# Patient Record
Sex: FEMALE | Race: WHITE | Employment: FULL TIME | URBAN - NONMETROPOLITAN AREA
[De-identification: names, ages, dates, MRNs, and addresses within clinical notes are randomized per-mention and may not be internally consistent; named-entity substitution may affect disease eponyms.]

---

## 2019-05-27 ENCOUNTER — HOSPITAL ENCOUNTER (EMERGENCY)
Age: 64
Discharge: HOME | End: 2019-05-27
Payer: COMMERCIAL

## 2019-05-27 VITALS
RESPIRATION RATE: 16 BRPM | OXYGEN SATURATION: 95 % | TEMPERATURE: 99.5 F | HEART RATE: 86 BPM | DIASTOLIC BLOOD PRESSURE: 77 MMHG | SYSTOLIC BLOOD PRESSURE: 142 MMHG | WEIGHT: 235 LBS

## 2019-05-27 DIAGNOSIS — J45.909 MILD ASTHMA WITHOUT COMPLICATION, UNSPECIFIED WHETHER PERSISTENT: ICD-10-CM

## 2019-05-27 DIAGNOSIS — G47.30 SLEEP APNEA, UNSPECIFIED TYPE: ICD-10-CM

## 2019-05-27 DIAGNOSIS — J01.40 ACUTE PANSINUSITIS, RECURRENCE NOT SPECIFIED: Primary | ICD-10-CM

## 2019-05-27 PROCEDURE — 99202 OFFICE O/P NEW SF 15 MIN: CPT | Performed by: NURSE PRACTITIONER

## 2019-05-27 PROCEDURE — 99213 OFFICE O/P EST LOW 20 MIN: CPT

## 2019-05-27 RX ORDER — ESOMEPRAZOLE MAGNESIUM 40 MG/1
20 FOR SUSPENSION ORAL 2 TIMES DAILY
COMMUNITY

## 2019-05-27 RX ORDER — MONTELUKAST SODIUM 10 MG/1
10 TABLET ORAL NIGHTLY
COMMUNITY

## 2019-05-27 RX ORDER — PROPRANOLOL HYDROCHLORIDE 80 MG/1
80 TABLET ORAL 3 TIMES DAILY
COMMUNITY

## 2019-05-27 RX ORDER — PREDNISONE 20 MG/1
20 TABLET ORAL 2 TIMES DAILY
COMMUNITY

## 2019-05-27 RX ORDER — AZITHROMYCIN 250 MG/1
TABLET, FILM COATED ORAL
Qty: 6 TABLET | Refills: 0 | Status: SHIPPED | OUTPATIENT
Start: 2019-05-27

## 2019-05-27 RX ORDER — MELOXICAM 15 MG/1
15 TABLET ORAL DAILY
COMMUNITY

## 2019-05-27 RX ORDER — EXEMESTANE 25 MG/1
25 TABLET ORAL DAILY
COMMUNITY

## 2019-05-27 RX ORDER — ESCITALOPRAM OXALATE 10 MG/1
10 TABLET ORAL DAILY
COMMUNITY

## 2019-05-27 ASSESSMENT — ENCOUNTER SYMPTOMS
CONSTIPATION: 0
SINUS PRESSURE: 1
RHINORRHEA: 1
WHEEZING: 0
ABDOMINAL PAIN: 0
DIARRHEA: 0
SORE THROAT: 1
SHORTNESS OF BREATH: 0
NAUSEA: 0
COUGH: 1

## 2019-05-27 ASSESSMENT — PAIN DESCRIPTION - LOCATION: LOCATION: THROAT

## 2019-05-27 ASSESSMENT — PAIN DESCRIPTION - FREQUENCY: FREQUENCY: CONTINUOUS

## 2019-05-27 ASSESSMENT — PAIN DESCRIPTION - DESCRIPTORS: DESCRIPTORS: ACHING

## 2019-05-27 ASSESSMENT — PAIN DESCRIPTION - PAIN TYPE: TYPE: ACUTE PAIN

## 2019-05-27 ASSESSMENT — PAIN - FUNCTIONAL ASSESSMENT: PAIN_FUNCTIONAL_ASSESSMENT: ACTIVITIES ARE NOT PREVENTED

## 2019-05-27 ASSESSMENT — PAIN SCALES - GENERAL: PAINLEVEL_OUTOF10: 2

## 2019-05-27 NOTE — ED PROVIDER NOTES
[facility], later [facility]  Urgent Care Encounter      CHIEF COMPLAINT       Chief Complaint   Patient presents with    Cough     productive, green    Pharyngitis     low grade fever    Sinusitis     pressure       Nurses Notes reviewed and I agree except as noted in the HPI. HISTORY OF PRESENT ILLNESS   Yonatan Nuñez is a 66 y.o. female who presents with 4 days of increasingly worse cough, congestion, sore throat, postnasal drip, headache, bilateral ear pressure, malaise and fever. Patient is here from [facility] to temporally take care of her parents. She has a history of fairly significant asthma and did start taking prednisone yesterday for her symptoms. She is a nonsmoker. She also has a history of sleep apnea and uses a CPAP. REVIEW OF SYSTEMS     Review of Systems   Constitutional: Positive for fatigue and fever. Negative for appetite change and diaphoresis. HENT: Positive for congestion, postnasal drip, rhinorrhea, sinus pressure and sore throat. Negative for tinnitus. Eyes: Negative for visual disturbance. Respiratory: Positive for cough. Negative for shortness of breath and wheezing. Cardiovascular: Negative for chest pain and leg swelling. Gastrointestinal: Negative for abdominal pain, constipation, diarrhea and nausea. Genitourinary: Negative for frequency. Musculoskeletal: Negative for arthralgias, myalgias and neck stiffness. Skin: Negative for rash. Neurological: Positive for headaches. Negative for dizziness and weakness. Psychiatric/Behavioral: The patient is not nervous/anxious. All other systems reviewed and are negative. PAST MEDICAL HISTORY         Diagnosis Date    Anxiety     Arthritis     Asthma     Cancer ([facility] [address] 2016    breast cancer    Depression     GERD (gastroesophageal reflux disease)     Seasonal allergies        SURGICAL HISTORY     Patient  has a past surgical history that includes knee surgery and Breast surgery.     CURRENT MEDICATIONS       Previous Medications    DOCUSATE SODIUM (COLACE PO)    Take by mouth    ESCITALOPRAM (LEXAPRO) 10 MG TABLET    Take 10 mg by mouth daily    ESOMEPRAZOLE MAGNESIUM (NEXIUM) 40 MG PACK    Take 20 mg by mouth 2 times daily    EXEMESTANE (AROMASIN) 25 MG TABLET    Take 25 mg by mouth daily    FLUTICASONE FUROATE-VILANTEROL (BREO ELLIPTA IN)    Inhale into the lungs    FLUTICASONE PROPIONATE (FLONASE NA)    by Nasal route    GUAIFENESIN (MUCINEX PO)    Take 2 tablets by mouth    IBANDRONATE SODIUM (BONIVA PO)    Take 1 tablet by mouth    MELOXICAM (MOBIC) 15 MG TABLET    Take 15 mg by mouth daily    MONTELUKAST (SINGULAIR) 10 MG TABLET    Take 10 mg by mouth nightly    PREDNISONE (DELTASONE) 20 MG TABLET    Take 20 mg by mouth 2 times daily    PROPRANOLOL (INDERAL) 80 MG TABLET    Take 80 mg by mouth 3 times daily       ALLERGIES     Patient is is allergic to phentermine hcl. FAMILY HISTORY     Patient'sfamily history includes Cancer in her father; Heart Attack in her mother; Heart Disease in her mother; High Blood Pressure in her father; High Cholesterol in her father. SOCIAL HISTORY     Patient  reports that she has never smoked. She has never used smokeless tobacco. She reports that she drinks alcohol. She reports that she does not use drugs. PHYSICAL EXAM     ED TRIAGE VITALS  BP: (!) 142/77, Temp: 99.5 °F (37.5 °C), Pulse: 86, Resp: 16, SpO2: 95 %  Physical Exam   Constitutional: She is oriented to person, place, and time. She appears well-developed and well-nourished. She is cooperative. She appears ill. No distress. HENT:   Right Ear: Hearing, external ear and ear canal normal. Tympanic membrane is bulging. Left Ear: Hearing, external ear and ear canal normal. Tympanic membrane is bulging. Nose: Mucosal edema and rhinorrhea present. Right sinus exhibits maxillary sinus tenderness. Left sinus exhibits maxillary sinus tenderness.    Mouth/Throat: Uvula is midline and mucous membranes are normal. No uvula swelling. Posterior oropharyngeal erythema present. No oropharyngeal exudate or posterior oropharyngeal edema. Tonsils are 0 on the right. Tonsils are 0 on the left. No tonsillar exudate. Eyes: Pupils are equal, round, and reactive to light. Conjunctivae, EOM and lids are normal. Right eye exhibits no discharge. Left eye exhibits no discharge. Neck: Normal range of motion and full passive range of motion without pain. Neck supple. No muscular tenderness present. Cardiovascular: Normal rate, regular rhythm and normal heart sounds. Pulmonary/Chest: Effort normal and breath sounds normal. No accessory muscle usage. No respiratory distress. She has no wheezes. She has no rales. Lymphadenopathy:     She has no cervical adenopathy. Neurological: She is alert and oriented to person, place, and time. She has normal strength. No cranial nerve deficit. Coordination and gait normal. GCS eye subscore is 4. GCS verbal subscore is 5. GCS motor subscore is 6. Skin: Skin is warm and dry. She is not diaphoretic. Psychiatric: She has a normal mood and affect. Her speech is normal and behavior is normal. Judgment and thought content normal. Cognition and memory are normal.   Nursing note and vitals reviewed. DIAGNOSTIC RESULTS   Labs: No results found for this visit on 05/27/19. IMAGING:    URGENT CARE COURSE:     Vitals:    05/27/19 1433   BP: (!) 142/77   Pulse: 86   Resp: 16   Temp: 99.5 °F (37.5 °C)   TempSrc: Oral   SpO2: 95%   Weight: 235 lb (106.6 kg)       Medications - No data to display  PROCEDURES:  None  FINAL IMPRESSION      1. Acute pansinusitis, recurrence not specified    2. Mild asthma without complication, unspecified whether persistent    3. Sleep apnea, unspecified type        DISPOSITION/PLAN   DISPOSITION Decision To Discharge 05/27/2019 03:03:17 PM    Patient is placed on Zithromax due to her preference from previous successful treatment.     PATIENT REFERRED TO:   [facility]  [address] [facility]  [phone]    If symptoms worsen    DISCHARGE MEDICATIONS:  New Prescriptions    AZITHROMYCIN (ZITHROMAX Z-MATTHIEU) 250 MG TABLET    2 tablets day 1 then1 tablet days 2 - 5.      Current Discharge Medication List          WISAM Zuleta CNP, APRN - CNP  05/27/19 3324

## 2019-05-27 NOTE — ED TRIAGE NOTES
Patient ambulated to  Rm. 5, here from [place], taking care of mother. C/O 6 days, productive harsh cough, sinus pressure, sore throat, swollen glands. Low grade fever, singulair, flonase, breo inhaler, prednisone. Patient states amoxicillin doesn't work for her.

## 2022-04-23 ENCOUNTER — HOSPITAL ENCOUNTER (EMERGENCY)
Age: 67
Discharge: HOME | End: 2022-04-23
Payer: COMMERCIAL

## 2022-04-23 VITALS
TEMPERATURE: 98.2 F | RESPIRATION RATE: 18 BRPM | DIASTOLIC BLOOD PRESSURE: 62 MMHG | BODY MASS INDEX: 34.99 KG/M2 | HEIGHT: 65 IN | SYSTOLIC BLOOD PRESSURE: 110 MMHG | WEIGHT: 210 LBS | HEART RATE: 79 BPM | OXYGEN SATURATION: 95 %

## 2022-04-23 DIAGNOSIS — N30.01 ACUTE CYSTITIS WITH HEMATURIA: Primary | ICD-10-CM

## 2022-04-23 LAB
BILIRUBIN URINE: NEGATIVE
BLOOD, URINE: ABNORMAL
CHARACTER, URINE: CLEAR
COLOR: YELLOW
GLUCOSE URINE: 100 MG/DL
KETONES, URINE: NEGATIVE
LEUKOCYTE ESTERASE, URINE: ABNORMAL
NITRITE, URINE: POSITIVE
PH UA: 5.5 (ref 5–9)
PROTEIN UA: >= 300 MG/DL
SPECIFIC GRAVITY UA: >= 1.03 (ref 1–1.03)
UROBILINOGEN, URINE: 1 EU/DL (ref 0.2–1)

## 2022-04-23 PROCEDURE — 87077 CULTURE AEROBIC IDENTIFY: CPT

## 2022-04-23 PROCEDURE — 99213 OFFICE O/P EST LOW 20 MIN: CPT

## 2022-04-23 PROCEDURE — 99213 OFFICE O/P EST LOW 20 MIN: CPT | Performed by: NURSE PRACTITIONER

## 2022-04-23 PROCEDURE — 87186 SC STD MICRODIL/AGAR DIL: CPT

## 2022-04-23 PROCEDURE — 81003 URINALYSIS AUTO W/O SCOPE: CPT

## 2022-04-23 PROCEDURE — 87086 URINE CULTURE/COLONY COUNT: CPT

## 2022-04-23 RX ORDER — NITROFURANTOIN 25; 75 MG/1; MG/1
100 CAPSULE ORAL 2 TIMES DAILY
Qty: 14 CAPSULE | Refills: 0 | Status: SHIPPED | OUTPATIENT
Start: 2022-04-23 | End: 2022-04-30

## 2022-04-23 RX ORDER — PHENAZOPYRIDINE HYDROCHLORIDE 100 MG/1
100 TABLET, FILM COATED ORAL 3 TIMES DAILY PRN
Qty: 12 TABLET | Refills: 0 | Status: SHIPPED | OUTPATIENT
Start: 2022-04-23 | End: 2022-04-26

## 2022-04-23 ASSESSMENT — ENCOUNTER SYMPTOMS
VOMITING: 0
SORE THROAT: 0
NAUSEA: 0
SHORTNESS OF BREATH: 0
CHEST TIGHTNESS: 0
DIARRHEA: 0
RHINORRHEA: 0
COUGH: 0

## 2022-04-23 NOTE — ED PROVIDER NOTES
[facility]  Urgent Care Encounter       CHIEF COMPLAINT       Chief Complaint   Patient presents with    Dysuria     urgency and  blood inurine onset last night       Nurses Notes reviewed and I agree except as noted in the HPI. HISTORY OF PRESENT ILLNESS   Darcie Barton is a 61 y.o. female who presents to the [facility] urgent care for evaluation of dysuria. She reports her symptoms started yesterday. She does report that she was in the hospital yesterday and did not drink much fluids besides soda. she denies having a history of frequent UTIs. She did report hematuria. She reports urgency and frequency. She denies back or flank pain. None    The history is provided by the patient. No  was used. REVIEW OF SYSTEMS     Review of Systems   Constitutional: Negative for activity change, appetite change, chills, fatigue and fever. HENT: Negative for ear discharge, ear pain, rhinorrhea and sore throat. Respiratory: Negative for cough, chest tightness and shortness of breath. Cardiovascular: Negative for chest pain. Gastrointestinal: Negative for diarrhea, nausea and vomiting. Genitourinary: Positive for dysuria, frequency and urgency. Skin: Negative for rash. Allergic/Immunologic: Negative for environmental allergies and food allergies. Neurological: Negative for dizziness and headaches. PAST MEDICAL HISTORY         Diagnosis Date    Anxiety     Arthritis     Asthma     Cancer ([facility] [address] 2016    breast cancer    Depression     GERD (gastroesophageal reflux disease)     Seasonal allergies        SURGICALHISTORY     Patient  has a past surgical history that includes knee surgery and Breast surgery.     CURRENT MEDICATIONS       Discharge Medication List as of 4/23/2022 12:45 PM      CONTINUE these medications which have NOT CHANGED    Details   montelukast (SINGULAIR) 10 MG tablet Take 10 mg by mouth nightlyHistorical Med      exemestane (AROMASIN) 25 MG tablet Take 25 mg by mouth dailyHistorical Med      Ibandronate Sodium (BONIVA PO) Take 1 tablet by mouthHistorical Med      meloxicam (MOBIC) 15 MG tablet Take 15 mg by mouth dailyHistorical Med      Fluticasone Propionate (FLONASE NA) by Nasal routeHistorical Med      propranolol (INDERAL) 80 MG tablet Take 80 mg by mouth 3 times dailyHistorical Med      escitalopram (LEXAPRO) 10 MG tablet Take 10 mg by mouth dailyHistorical Med      Docusate Sodium (COLACE PO) Take by mouthHistorical Med      esomeprazole Magnesium (NEXIUM) 40 MG PACK Take 20 mg by mouth 2 times dailyHistorical Med      predniSONE (DELTASONE) 20 MG tablet Take 20 mg by mouth 2 times dailyHistorical Med      guaiFENesin (MUCINEX PO) Take 2 tablets by mouthHistorical Med      Fluticasone Furoate-Vilanterol (BREO ELLIPTA IN) Inhale into the lungsHistorical Med      azithromycin (ZITHROMAX Z-MATTHIEU) 250 MG tablet 2 tablets day 1 then1 tablet days 2 - 5., Disp-6 tablet, R-0Normal             ALLERGIES     Patient is is allergic to phentermine hcl. Patients   There is no immunization history on file for this patient. FAMILY HISTORY     Patient's family history includes Cancer in her father; Heart Attack in her mother; Heart Disease in her mother; High Blood Pressure in her father; High Cholesterol in her father. SOCIAL HISTORY     Patient  reports that she has never smoked. She has never used smokeless tobacco. She reports current alcohol use. She reports that she does not use drugs. PHYSICAL EXAM     ED TRIAGE VITALS  BP: 110/62, Temp: 98.2 °F (36.8 °C), Pulse: 79, Resp: 18, SpO2: 95 %,Estimated body mass index is 34.95 kg/m² as calculated from the following:    Height as of this encounter: 5' 5\" (1.651 m). Weight as of this encounter: 210 lb (95.3 kg). ,No LMP recorded. Patient is postmenopausal.    Physical Exam  Vitals and nursing note reviewed. Constitutional:       General: She is not in acute distress. Appearance: Normal appearance. She is not ill-appearing, toxic-appearing or diaphoretic. HENT:      Head: Normocephalic. Right Ear: Ear canal and external ear normal.      Left Ear: Ear canal and external ear normal.      Nose: Nose normal. No congestion or rhinorrhea. Mouth/Throat:      Mouth: Mucous membranes are moist.      Pharynx: Oropharynx is clear. No oropharyngeal exudate or posterior oropharyngeal erythema. Cardiovascular:      Rate and Rhythm: Normal rate. Pulses: Normal pulses. Pulmonary:      Effort: Pulmonary effort is normal. No respiratory distress. Breath sounds: No stridor. No wheezing or rhonchi. Abdominal:      General: Abdomen is flat. Bowel sounds are normal.      Palpations: Abdomen is soft. Musculoskeletal:         General: No swelling or tenderness. Normal range of motion. Cervical back: Normal range of motion. Neurological:      General: No focal deficit present. Mental Status: She is alert and oriented to person, place, and time.    Psychiatric:         Mood and Affect: Mood normal.         Behavior: Behavior normal.         DIAGNOSTIC RESULTS     Labs:  Results for orders placed or performed during the hospital encounter of 04/23/22   Urinalysis   Result Value Ref Range    Glucose, Ur 100 (A) NEGATIVE mg/dl    Bilirubin Urine Negative NEGATIVE    Ketones, Urine Negative NEGATIVE    Specific Gravity, UA >=1.030 1.002 - 1.030    Blood, Urine Large (A) NEGATIVE    pH, UA 5.50 5.0 - 9.0    Protein, UA >= 300 (A) NEGATIVE mg/dl    Urobilinogen, Urine 1.00 0.2 - 1.0 eu/dl    Nitrite, Urine Positive (A) NEGATIVE    Leukocyte Esterase, Urine Moderate (A) NEGATIVE    Color, UA Yellow STRAW-YELLOW    Character, Urine Clear CLEAR-SL CLOUD       IMAGING:    No orders to display         EKG: None      URGENT CARE COURSE:     Vitals:    04/23/22 1222   BP: 110/62   Pulse: 79   Resp: 18   Temp: 98.2 °F (36.8 °C)   SpO2: 95%   Weight: 210 lb (95.3 kg)   Height: 5' 5\" (1.651 m)       Medications - No data to display         PROCEDURES:  None    FINAL IMPRESSION      1. Acute cystitis with hematuria          DISPOSITION/ PLAN     Patient seen and evaluated for dysuria. A urinalysis was obtained revealing large blood, moderate leukocytes, and positive nitrites. She is provided a prescription for Macrobid and Pyridium. A urine culture has also been ordered. She is instructed to follow-up with her PCP in 3 to 5 days no worsening symptoms. She does report that she is from [place] and that she will be traveling home to [place] in the next 5 to 7 days. She is agreeable with the above plan and denies questions or concerns at this time. PATIENT REFERRED TO:  No primary care provider on file. No primary physician on file.       DISCHARGE MEDICATIONS:  Discharge Medication List as of 4/23/2022 12:45 PM      START taking these medications    Details   nitrofurantoin, macrocrystal-monohydrate, (MACROBID) 100 MG capsule Take 1 capsule by mouth 2 times daily for 7 days, Disp-14 capsule, R-0Normal      phenazopyridine (PYRIDIUM) 100 MG tablet Take 1 tablet by mouth 3 times daily as needed for Pain, Disp-12 tablet, R-0Normal             Discharge Medication List as of 4/23/2022 12:45 PM          Discharge Medication List as of 4/23/2022 12:45 PM          Poonam Clayton, APRN - CNP    (Please note that portions of this note were completed with a voice recognition program. Efforts were made to edit the dictations but occasionally words are mis-transcribed.)           WISAM Todd CNP  04/23/22 5848

## 2022-04-25 LAB
ORGANISM: ABNORMAL
URINE CULTURE, ROUTINE: ABNORMAL

## 2023-02-25 ENCOUNTER — APPOINTMENT (OUTPATIENT)
Dept: GENERAL RADIOLOGY | Age: 68
End: 2023-02-25
Payer: MEDICARE

## 2023-02-25 ENCOUNTER — HOSPITAL ENCOUNTER (EMERGENCY)
Age: 68
Discharge: HOME | End: 2023-02-25
Payer: MEDICARE

## 2023-02-25 VITALS
SYSTOLIC BLOOD PRESSURE: 123 MMHG | TEMPERATURE: 98.6 F | OXYGEN SATURATION: 97 % | BODY MASS INDEX: 33.28 KG/M2 | WEIGHT: 200 LBS | DIASTOLIC BLOOD PRESSURE: 74 MMHG | RESPIRATION RATE: 16 BRPM | HEART RATE: 72 BPM

## 2023-02-25 DIAGNOSIS — S32.040A CLOSED COMPRESSION FRACTURE OF L4 LUMBAR VERTEBRA, INITIAL ENCOUNTER (HCC): Primary | ICD-10-CM

## 2023-02-25 PROCEDURE — 72100 X-RAY EXAM L-S SPINE 2/3 VWS: CPT

## 2023-02-25 PROCEDURE — 99213 OFFICE O/P EST LOW 20 MIN: CPT | Performed by: EMERGENCY MEDICINE

## 2023-02-25 PROCEDURE — 99213 OFFICE O/P EST LOW 20 MIN: CPT

## 2023-02-25 RX ORDER — TRAMADOL HYDROCHLORIDE 50 MG/1
50 TABLET ORAL EVERY 6 HOURS PRN
Qty: 12 TABLET | Refills: 0 | Status: SHIPPED | OUTPATIENT
Start: 2023-02-25 | End: 2023-02-28

## 2023-02-25 RX ORDER — PREDNISONE 20 MG/1
20 TABLET ORAL DAILY
Qty: 5 TABLET | Refills: 0 | Status: SHIPPED | OUTPATIENT
Start: 2023-02-25 | End: 2023-03-02

## 2023-02-25 ASSESSMENT — PAIN DESCRIPTION - ORIENTATION: ORIENTATION: LOWER

## 2023-02-25 ASSESSMENT — PAIN - FUNCTIONAL ASSESSMENT: PAIN_FUNCTIONAL_ASSESSMENT: 0-10

## 2023-02-25 ASSESSMENT — ENCOUNTER SYMPTOMS
ABDOMINAL PAIN: 0
SHORTNESS OF BREATH: 0
COUGH: 0
BACK PAIN: 1

## 2023-02-25 ASSESSMENT — PAIN SCALES - GENERAL: PAINLEVEL_OUTOF10: 7

## 2023-02-25 ASSESSMENT — PAIN DESCRIPTION - LOCATION: LOCATION: BACK

## 2023-02-25 NOTE — DISCHARGE INSTRUCTIONS
Prednisone daily as prescribed    Tramadol as directed as needed for pain    You may take Tylenol additionally for pain control    Ice to the lower back    Follow-up with orthopedics in [place] when you return on Tuesday    Go to the emergency department for worsening back pain, worsening numbness or tingling down 1 or both legs, weakness to your legs, loss of control of bowel or bladder, or any new concerns

## 2023-02-25 NOTE — ED TRIAGE NOTES
Lily Navas arrives to room with complaint of  low back pain after fall today. Pt fell on gluteal today trying to help her father from the floor after a fall, now will low back pain, with pain radiating down both legs.

## 2023-02-25 NOTE — ED PROVIDER NOTES
[facility]       CHIEF COMPLAINT       Chief Complaint   Patient presents with    Back Pain       Nurses Notes reviewed and I agree except as noted in the HPI. HISTORY OF PRESENT ILLNESS   Carroll Larson is a 74 y.o. female who presents for complaints of low back pain. Patient states that her father fell today. She was trying to help him up from the ground and she fell backwards. She states she landed on her buttocks. She was able to stand up and called the squad to help her dad. She states that she has been having lumbar back pain since her fall. The patient states that she has scoliosis and has degenerative disc disease in the lower back. She is treated with chiropractor therapy. She states this keeps it under control. She states the pain is a 7 on a 10 scale and when she sits down, she gets some radiation down each leg to her thigh level. When she stands up, the radiation stops. She denies any weakness to her lower extremities. No loss of bowel or bladder continence. HPI    REVIEW OF SYSTEMS     Review of Systems   Constitutional:  Negative for activity change and fever. Respiratory:  Negative for cough and shortness of breath. Gastrointestinal:  Negative for abdominal pain. Genitourinary:  Negative for decreased urine volume, difficulty urinating and urgency. Musculoskeletal:  Positive for back pain. PAST MEDICAL HISTORY         Diagnosis Date    Anxiety     Arthritis     Asthma     Cancer ([facility] [address] 2016    breast cancer    Depression     GERD (gastroesophageal reflux disease)     Seasonal allergies        SURGICALHISTORY     Patient  has a past surgical history that includes knee surgery and Breast surgery.     CURRENT MEDICATIONS       Discharge Medication List as of 2/25/2023  4:32 PM        CONTINUE these medications which have NOT CHANGED    Details   Fluticasone-Salmeterol (AIRDUO RESPICLICK 989/54 IN) Inhale into the lungsHistorical Med montelukast (SINGULAIR) 10 MG tablet Take 10 mg by mouth nightlyHistorical Med      exemestane (AROMASIN) 25 MG tablet Take 25 mg by mouth dailyHistorical Med      propranolol (INDERAL) 80 MG tablet Take 80 mg by mouth 3 times dailyHistorical Med      escitalopram (LEXAPRO) 10 MG tablet Take 10 mg by mouth dailyHistorical Med      Docusate Sodium (COLACE PO) Take by mouthHistorical Med             ALLERGIES     Patient is is allergic to phentermine hcl. Patients   There is no immunization history on file for this patient. FAMILY HISTORY     Patient's family history includes Cancer in her father; Heart Attack in her mother; Heart Disease in her mother; High Blood Pressure in her father; High Cholesterol in her father. SOCIAL HISTORY     Patient  reports that she has never smoked. She has never used smokeless tobacco. She reports current alcohol use. She reports that she does not use drugs. PHYSICAL EXAM     ED TRIAGE VITALS  BP: 123/74, Temp: 98.6 °F (37 °C), Heart Rate: 72, Resp: 16, SpO2: 97 %,Estimated body mass index is 33.28 kg/m² as calculated from the following:    Height as of 4/23/22: 5' 5\" (1.651 m). Weight as of this encounter: 200 lb (90.7 kg). ,No LMP recorded. Patient is postmenopausal.    Physical Exam  Constitutional:       Appearance: She is obese. She is not ill-appearing. HENT:      Head: Normocephalic. Cardiovascular:      Rate and Rhythm: Normal rate. Pulmonary:      Effort: Pulmonary effort is normal.   Musculoskeletal:      Lumbar back: Tenderness and bony tenderness present. No deformity or spasms. Back:       Comments: Tenderness to the lower lumbar/sacral region of her back. No bruising or swelling is noted. Neurological:      Mental Status: She is alert. DIAGNOSTIC RESULTS     Labs:No results found for this visit on 02/25/23. IMAGING:    XR LUMBAR SPINE (2-3 VIEWS)   Final Result   Age-indeterminate compression fracture of the L4 vertebral body. **This report has been created using voice recognition software. It may contain minor errors which are inherent in voice recognition technology. **      Final report electronically signed by Dr Errol Arvizu on 2/25/2023 4:20 PM            EKG:      URGENT CARE COURSE:     Vitals:    02/25/23 1546   BP: 123/74   Pulse: 72   Resp: 16   Temp: 98.6 °F (37 °C)   TempSrc: Oral   SpO2: 97%   Weight: 200 lb (90.7 kg)       Medications - No data to display         PROCEDURES:  None    FINAL IMPRESSION      1. Closed compression fracture of L4 lumbar vertebra, initial encounter [facility])          DISPOSITION/ PLAN   Patient presents for is likely a new L4 compression fracture. Patient has had lumbar x-rays in [place], [place] in December 2022. There is no compression fracture noted on the x-ray reading. Patient has no neurodeficits but does have some mild pain radiation to bilateral hips when she sits down. I will place patient on prednisone and tramadol for treatment of symptoms. She is advised take Tylenol. Ice to the area frequently. The patient is from [place], [place] area. I advised I can set up with orthopedic spine in the area on Monday of next week, however, she will be going to [facility] on Tuesday and states would rather see her orthopedist in that area. The patient is advised to go to the emergency department for evaluation for any weakness to her lower extremities, any loss of bowel or bladder control, worsening pain or any new concerns. Patient verbalized understanding of all instructions        PATIENT REFERRED TO:  No primary care provider on file. No primary physician on file. DISCHARGE MEDICATIONS:  Discharge Medication List as of 2/25/2023  4:32 PM        START taking these medications    Details   traMADol (ULTRAM) 50 MG tablet Take 1 tablet by mouth every 6 hours as needed for Pain for up to 3 days. Intended supply: 3 days.  Take lowest dose possible to manage pain Max Daily Amount: 200 mg, Disp-12 tablet, R-0Normal             Discharge Medication List as of 2/25/2023  4:32 PM        STOP taking these medications       Ibandronate Sodium (BONIVA PO) Comments:   Reason for Stopping:         meloxicam (MOBIC) 15 MG tablet Comments:   Reason for Stopping:         Fluticasone Propionate (FLONASE NA) Comments:   Reason for Stopping:         esomeprazole Magnesium (NEXIUM) 40 MG PACK Comments:   Reason for Stopping:         guaiFENesin (MUCINEX PO) Comments:   Reason for Stopping:         Fluticasone Furoate-Vilanterol (BREO ELLIPTA IN) Comments:   Reason for Stopping:         azithromycin (ZITHROMAX Z-MATTHIEU) 250 MG tablet Comments:   Reason for Stopping:               Discharge Medication List as of 2/25/2023  4:32 PM        CONTINUE these medications which have CHANGED    Details   predniSONE (DELTASONE) 20 MG tablet Take 1 tablet by mouth daily for 5 days, Disp-5 tablet, R-0Normal             Jyothi Palisade, APRN - CNP    (Please note that portions of this note were completed with a voice recognition program. Efforts were made to edit the dictations but occasionally words are mis-transcribed.)           WISAM Qureshi CNP  02/25/23 2236